# Patient Record
Sex: FEMALE | Race: WHITE | Employment: FULL TIME | URBAN - METROPOLITAN AREA
[De-identification: names, ages, dates, MRNs, and addresses within clinical notes are randomized per-mention and may not be internally consistent; named-entity substitution may affect disease eponyms.]

---

## 2018-06-27 ENCOUNTER — APPOINTMENT (OUTPATIENT)
Dept: GENERAL RADIOLOGY | Age: 58
End: 2018-06-27
Attending: FAMILY MEDICINE
Payer: COMMERCIAL

## 2018-06-27 ENCOUNTER — HOSPITAL ENCOUNTER (OUTPATIENT)
Age: 58
Discharge: HOME | End: 2018-06-27
Attending: FAMILY MEDICINE
Payer: COMMERCIAL

## 2018-06-27 VITALS
TEMPERATURE: 99 F | DIASTOLIC BLOOD PRESSURE: 77 MMHG | RESPIRATION RATE: 16 BRPM | SYSTOLIC BLOOD PRESSURE: 176 MMHG | OXYGEN SATURATION: 96 % | HEART RATE: 77 BPM

## 2018-06-27 DIAGNOSIS — S60.041A CONTUSION OF RIGHT RING FINGER WITHOUT DAMAGE TO NAIL, INITIAL ENCOUNTER: Primary | ICD-10-CM

## 2018-06-27 PROCEDURE — 73140 X-RAY EXAM OF FINGER(S): CPT | Performed by: FAMILY MEDICINE

## 2018-06-27 PROCEDURE — 99203 OFFICE O/P NEW LOW 30 MIN: CPT

## 2018-06-27 PROCEDURE — 29130 APPL FINGER SPLINT STATIC: CPT

## 2018-06-27 RX ORDER — IBUPROFEN 800 MG/1
800 TABLET ORAL ONCE
Status: COMPLETED | OUTPATIENT
Start: 2018-06-27 | End: 2018-06-27

## 2018-06-27 NOTE — ED INITIAL ASSESSMENT (HPI)
Yesterday the patient was closing a door when her right fourth finger became caught in the door. Swelling and ecchymosis noted. CMS adequate.

## 2018-06-28 NOTE — ED PROVIDER NOTES
Patient Seen in: [facility]    History   Patient presents with:  Upper Extremity Injury (musculoskeletal)    Stated Complaint: [address] DO[address]    HPI    61year old female presents for right 4th digit 4th digit exam with mild swelling and ecchymosis over the distal phalanx over volar aspect. tenderness on palpation, limited ROM of DIP due to pain. Neurological: She is alert and oriented to person, place, and time. Skin: Skin is warm and dry.